# Patient Record
Sex: FEMALE | URBAN - METROPOLITAN AREA
[De-identification: names, ages, dates, MRNs, and addresses within clinical notes are randomized per-mention and may not be internally consistent; named-entity substitution may affect disease eponyms.]

---

## 2017-10-24 ENCOUNTER — ALLIED HEALTH/NURSE VISIT (OUTPATIENT)
Dept: FAMILY MEDICINE | Facility: CLINIC | Age: 27
End: 2017-10-24

## 2017-10-24 DIAGNOSIS — Z23 NEED FOR PROPHYLACTIC VACCINATION AND INOCULATION AGAINST INFLUENZA: Primary | ICD-10-CM

## 2017-10-24 NOTE — MR AVS SNAPSHOT
After Visit Summary   10/24/2017    Jonna Bryant    MRN: [id number]           Patient Information     Date Of Birth          1990        Visit Information        Provider Department      10/24/2017 5:00 PM Nurse, [facility]        Today's Diagnoses     Need for prophylactic vaccination and inoculation against influenza    -  1       Follow-ups after your visit        Who to contact     Please call your clinic at [phone] to:    Ask questions about your health    Make or cancel appointments    Discuss your medicines    Learn about your test results    Speak to your doctor   If you have compliments or concerns about an experience at your clinic, or if you wish to file a complaint, please contact [facility] Relations at [phone] or email us at [email or web address]         Additional Information About Your Visit        MyChart Information     [employer] is an electronic gateway that provides easy, online access to your medical records. With [employer], you can request a clinic appointment, read your test results, renew a prescription or communicate with your care team.     To sign up for [employer] visit the website at [email or web address]   You will be asked to enter the access code listed below, as well as some personal information. Please follow the directions to create your username and password.     Your access code is: [email or web address]  Expires: 2018  5:35 PM     Your access code will  in 90 days. If you need help or a new code, please contact your [facility] or call [phone] for assistance.        Care EveryWhere ID     This is your Care EveryWhere ID. This could be used by other organizations to access your [place] medical records  [id number]         Blood Pressure from Last 3 Encounters:   16 114/77   09/17/15 114/74   02/20/15 124/80    Weight from Last 3  Encounters:   07/26/16 179 lb 1 oz (81.2 kg)   09/17/15 161 lb (73 kg)   02/20/15 169 lb (76.7 kg)              We Performed the Following     FLU VAC, SPLIT VIRUS IM > 3 YO (QUADRIVALENT) [90217]     Vaccine Administration, Initial [79487]        Primary Care Provider    Physician No Ref-Primary       NO REF-PRIMARY PHYSICIAN        Equal Access to Services     [facility] IVAN : Hadii aad ku hadasho Soomaali, waaxda luqadaha, qaybta kaalmada adeaidanyada, marina mcarthur nabiln gia domingo jakub . So [facility] [phone].    ATENCIÓN: Si habla español, tiene a enriquez disposición servicios gratuitos de asistencia lingüística. Veronikaame al [phone].    We comply with applicable federal civil rights laws and [place] laws. We do not discriminate on the basis of race, color, national origin, age, disability, sex, sexual orientation, or gender identity.            Thank you!     Thank you for choosing [facility]  for your care. Our goal is always to provide you with excellent care. Hearing back from our patients is one way we can continue to improve our services. Please take a few minutes to complete the written survey that you may receive in the mail after your visit with us. Thank you!             Your Updated Medication List - Protect others around you: Learn how to safely use, store and throw away your medicines at www.disposemymeds.org.          This list is accurate as of: 10/24/17  5:35 PM.  Always use your most recent med list.                   Brand Name Dispense Instructions for use Diagnosis    albuterol 108 (90 BASE) MCG/ACT Inhaler    PROVENTIL HFA    1 Inhaler    Inhale 2 puffs into the lungs every 6 hours    Acute bronchitis with symptoms > 10 days, Productive cough       azithromycin 250 MG tablet    ZITHROMAX    6 tablet    2 tabs po qd day 1, then 1 tab po qd days 2-5    Acute bronchitis with symptoms > 10 days, Productive cough       guaiFENesin-codeine 100-10 MG/5ML Soln solution    ROBITUSSIN AC    120  mL    Take 5-10 mLs by mouth nightly as needed for cough    Acute bronchitis with symptoms > 10 days       loratadine 10 MG tablet    CLARITIN     Take 10 mg by mouth daily        predniSONE 20 MG tablet    DELTASONE    10 tablet    Take 1 tablet (20 mg) by mouth 2 times daily    Acute bronchitis with symptoms > 10 days, Productive cough       VESTURA 3-0.02 MG per tablet   Generic drug:  drospirenone-ethinyl estradiol      Take 1 tablet by mouth daily